# Patient Record
(demographics unavailable — no encounter records)

---

## 2025-01-07 NOTE — HISTORY OF PRESENT ILLNESS
[FreeTextEntry1] : 3 month followup of chronic medical conditions, and prescription refills.\par  \par   [de-identified] : Patient presents for follow-up of chronic medical conditions-s/p right CVA/internal carotid artery dissection, seizure disorder, hypertension, hyperlipidemia, prostate CA, low vitamin D-and prescription refills. Completed full course of proton radiation therapy for prostate cancer. Tolerated all treatment sessions. (+)increased urgency and frequency. Controlled with tamsulosin/finasteride. Otherwise, feels well in general. No other new symptoms. Tolerating all prescription medications. Symptoms well-controlled on present medication regimen. Needs maintenance laboratory testing. Wished to begin oral therapy for alopecia hereditaria, and was begun on finasteride 2.5 mg/day. Also, presently using topical minoxidil solution with good results. Needs maintenance laboratory testing.

## 2025-01-07 NOTE — HEALTH RISK ASSESSMENT
[0] : 2) Feeling down, depressed, or hopeless: Not at all (0) [PHQ-2 Negative - No further assessment needed] : PHQ-2 Negative - No further assessment needed [Never] : Never [[id number]] : 0

## 2025-01-07 NOTE — REVIEW OF SYSTEMS
[Hesitancy] : hesitancy [Nocturia] : nocturia [Frequency] : frequency [Negative] : Heme/Lymph [Recent Change In Weight] : ~T no recent weight change [Chest Pain] : no chest pain [Palpitations] : no palpitations [Claudication] : no  leg claudication [Lower Ext Edema] : no lower extremity edema [Shortness Of Breath] : no shortness of breath [Wheezing] : no wheezing [Cough] : no cough [Dyspnea on Exertion] : not dyspnea on exertion [Abdominal Pain] : no abdominal pain [Dysuria] : no dysuria [Hematuria] : no hematuria [Skin Rash] : no skin rash [Headache] : no headache [Confusion] : no confusion [Unsteady Walk] : no ataxia [Easy Bleeding] : no easy bleeding [Easy Bruising] : no easy bruising [Swollen Glands] : no swollen glands

## 2025-01-07 NOTE — HEALTH RISK ASSESSMENT
[0] : 2) Feeling down, depressed, or hopeless: Not at all (0) [PHQ-2 Negative - No further assessment needed] : PHQ-2 Negative - No further assessment needed [Never] : Never [[id number]] : 0

## 2025-01-07 NOTE — PHYSICAL EXAM
[No Acute Distress] : no acute distress [Well Nourished] : well nourished [Well Developed] : well developed [Well-Appearing] : well-appearing [Normal Voice/Communication] : normal voice/communication [Normal Sclera/Conjunctiva] : normal sclera/conjunctiva [PERRL] : pupils equal round and reactive to light [EOMI] : extraocular movements intact [Fundoscopic Exam Performed] : fundoscopic ~T exam ~C was performed [No JVD] : no jugular venous distention [Supple] : supple [No Respiratory Distress] : no respiratory distress  [Clear to Auscultation] : lungs were clear to auscultation bilaterally [Normal Rate] : normal rate  [Regular Rhythm] : with a regular rhythm [Normal S1, S2] : normal S1 and S2 [No Murmur] : no murmur heard [No Carotid Bruits] : no carotid bruits [Pedal Pulses Present] : the pedal pulses are present [No Edema] : there was no peripheral edema [Soft] : abdomen soft [Non Tender] : non-tender [No HSM] : no HSM [Normal Bowel Sounds] : normal bowel sounds [Normal Axillary Nodes] : no axillary lymphadenopathy [Normal Posterior Cervical Nodes] : no posterior cervical lymphadenopathy [Normal Anterior Cervical Nodes] : no anterior cervical lymphadenopathy [Normal Inguinal Nodes] : no inguinal lymphadenopathy [No CVA Tenderness] : no CVA  tenderness [No Spinal Tenderness] : no spinal tenderness [No Joint Swelling] : no joint swelling [Grossly Normal Strength/Tone] : grossly normal strength/tone [No Rash] : no rash [Coordination Grossly Intact] : coordination grossly intact [No Focal Deficits] : no focal deficits [Normal Gait] : normal gait [Speech Grossly Normal] : speech grossly normal [Alert and Oriented x3] : oriented to person, place, and time [Normal Insight/Judgement] : insight and judgment were intact

## 2025-01-07 NOTE — PLAN
[FreeTextEntry1] : 1)-Treatment plan as outlined above.\par  2)-No other prescription refills needed at this time.\par  3)-Laboratory specimens drawn in the clinic.

## 2025-01-07 NOTE — HISTORY OF PRESENT ILLNESS
[FreeTextEntry1] : 3 month followup of chronic medical conditions, and prescription refills.\par  \par   [de-identified] : Patient presents for follow-up of chronic medical conditions-s/p right CVA/internal carotid artery dissection, seizure disorder, hypertension, hyperlipidemia, prostate CA, low vitamin D-and prescription refills. Completed full course of proton radiation therapy for prostate cancer. Tolerated all treatment sessions. (+)increased urgency and frequency. Controlled with tamsulosin/finasteride. Otherwise, feels well in general. No other new symptoms. Tolerating all prescription medications. Symptoms well-controlled on present medication regimen. Needs maintenance laboratory testing. Wished to begin oral therapy for alopecia hereditaria, and was begun on finasteride 2.5 mg/day. Also, presently using topical minoxidil solution with good results. Needs maintenance laboratory testing.

## 2025-04-10 NOTE — HISTORY OF PRESENT ILLNESS
[FreeTextEntry1] : 3 month followup of chronic medical conditions, and prescription refills.\par  \par   [de-identified] : Patient presents for follow-up of chronic medical conditions-s/p right CVA/internal carotid artery dissection, seizure disorder, hypertension, hyperlipidemia, prostate CA, low vitamin D-and prescription refills. Completed full course of proton radiation therapy for prostate cancer. Tolerated all treatment sessions. (+)increased urgency and frequency. Controlled with tamsulosin/finasteride. Otherwise, feels well in general. No other new symptoms. Tolerating all prescription medications. Symptoms well-controlled on present medication regimen. Needs maintenance laboratory testing. Wished to begin oral therapy for alopecia hereditaria, and was begun on finasteride 2.5 mg/day. Also, presently using topical minoxidil solution with good results. Needs maintenance laboratory testing.

## 2025-04-10 NOTE — HISTORY OF PRESENT ILLNESS
[FreeTextEntry1] : 3 month followup of chronic medical conditions, and prescription refills.\par  \par   [de-identified] : Patient presents for follow-up of chronic medical conditions-s/p right CVA/internal carotid artery dissection, seizure disorder, hypertension, hyperlipidemia, prostate CA, low vitamin D-and prescription refills. Completed full course of proton radiation therapy for prostate cancer. Tolerated all treatment sessions. (+)increased urgency and frequency. Controlled with tamsulosin/finasteride. Otherwise, feels well in general. No other new symptoms. Tolerating all prescription medications. Symptoms well-controlled on present medication regimen. Needs maintenance laboratory testing. Wished to begin oral therapy for alopecia hereditaria, and was begun on finasteride 2.5 mg/day. Also, presently using topical minoxidil solution with good results. Needs maintenance laboratory testing.

## 2025-04-10 NOTE — HEALTH RISK ASSESSMENT
[0] : 2) Feeling down, depressed, or hopeless: Not at all (0) [PHQ-2 Negative - No further assessment needed] : PHQ-2 Negative - No further assessment needed [Never] : Never [[id number]] : 0

## 2025-04-10 NOTE — HEALTH RISK ASSESSMENT
[0] : 2) Feeling down, depressed, or hopeless: Not at all (0) [PHQ-2 Negative - No further assessment needed] : PHQ-2 Negative - No further assessment needed [Never] : Never [[id number]] : 0

## 2025-07-10 NOTE — HEALTH RISK ASSESSMENT
[0] : 2) Feeling down, depressed, or hopeless: Not at all (0) [PHQ-2 Negative - No further assessment needed] : PHQ-2 Negative - No further assessment needed [Never] : Never [[id number]] : 0

## 2025-07-10 NOTE — HEALTH RISK ASSESSMENT
[0] : 2) Feeling down, depressed, or hopeless: Not at all (0) [PHQ-2 Negative - No further assessment needed] : PHQ-2 Negative - No further assessment needed [Never] : Never [[id number]] : 0

## 2025-07-10 NOTE — HISTORY OF PRESENT ILLNESS
[FreeTextEntry1] : 3-month follow-up of chronic medical conditions, and prescription refills.   [de-identified] : Patient presents for follow-up of chronic medical conditions-s/p right CVA/internal carotid artery dissection, seizure disorder, hypertension, hyperlipidemia, prostate CA, low vitamin D-and prescription refills. Completed full course of proton radiation therapy for prostate cancer. Tolerated all treatment sessions. (+)increased urgency and frequency. Controlled with tamsulosin/finasteride. Otherwise, feels well in general. No other new symptoms. Tolerating all prescription medications. Symptoms well-controlled on present medication regimen. Needs maintenance laboratory testing. Wished to begin oral therapy for alopecia hereditaria, and was begun on finasteride 2.5 mg/day. Also, presently using topical minoxidil solution with good results. Needs maintenance laboratory testing.

## 2025-07-10 NOTE — HISTORY OF PRESENT ILLNESS
[FreeTextEntry1] : 3-month follow-up of chronic medical conditions, and prescription refills.   [de-identified] : Patient presents for follow-up of chronic medical conditions-s/p right CVA/internal carotid artery dissection, seizure disorder, hypertension, hyperlipidemia, prostate CA, low vitamin D-and prescription refills. Completed full course of proton radiation therapy for prostate cancer. Tolerated all treatment sessions. (+)increased urgency and frequency. Controlled with tamsulosin/finasteride. Otherwise, feels well in general. No other new symptoms. Tolerating all prescription medications. Symptoms well-controlled on present medication regimen. Needs maintenance laboratory testing. Wished to begin oral therapy for alopecia hereditaria, and was begun on finasteride 2.5 mg/day. Also, presently using topical minoxidil solution with good results. Needs maintenance laboratory testing.

## 2025-07-10 NOTE — REVIEW OF SYSTEMS
[Hesitancy] : hesitancy [Nocturia] : nocturia [Frequency] : frequency [Negative] : Heme/Lymph [Fatigue] : no fatigue [Recent Change In Weight] : ~T no recent weight change [Chest Pain] : no chest pain [Palpitations] : no palpitations [Claudication] : no  leg claudication [Lower Ext Edema] : no lower extremity edema [Shortness Of Breath] : no shortness of breath [Wheezing] : no wheezing [Cough] : no cough [Dyspnea on Exertion] : not dyspnea on exertion [Abdominal Pain] : no abdominal pain [Dysuria] : no dysuria [Hematuria] : no hematuria [Skin Rash] : no skin rash [Headache] : no headache [Confusion] : no confusion [Unsteady Walk] : no ataxia [Easy Bleeding] : no easy bleeding [Easy Bruising] : no easy bruising [Swollen Glands] : no swollen glands